# Patient Record
Sex: FEMALE | URBAN - METROPOLITAN AREA
[De-identification: names, ages, dates, MRNs, and addresses within clinical notes are randomized per-mention and may not be internally consistent; named-entity substitution may affect disease eponyms.]

---

## 2024-05-28 DIAGNOSIS — Z84.81 FAMILY HISTORY OF GENETIC DISEASE CARRIER: Primary | ICD-10-CM

## 2024-05-28 NOTE — PROGRESS NOTES
"I reached out to Jesica, the biological mother of one of my parents, at the request of her adoptive parents (child's MRN: [id number]). I spoke to Jesica to review the possibility of parental testing for one or both of Amy's genetic changes.      We reviewed that we want to test Jesica to learn about both Amy's health (if a variant was inherited from a healthy parent, it may lower our suspicion that it is causing her problems) and for Jesica's health (are there other concerns she should be watching out for).      Jesica notes that she has fairly significant health problems ranging from bone aches/fatigue to learning disabilities. She reports an abnormality of gait - she walks on the inner or outer portion of her foot for stability and often will \"flick\" her foot out in front of her to gain stability.      Jesica reports that concern for developmental delays began at around age 5. She notes that school was very difficult and she had IEPs for the duration of her time in school. She reports that she \"doesn't learn normally\" and that learning new things takes her longer than others. She reports significant difficulty with understanding people's facial expressions. She notes that she needs more verbal communication than other people do and she requires things to be broken down. She states that she has a difficult time holding down jobs and that \"no matter what I do, it's never good enough\". She feels that employers will \"give up on her\" rather than take the time to teach things in a way she'll understand them. Jesica reports diagnoses of ADHD, bipolar disorder, major depressive disorder and extreme fear of abandonment (even when illogical).     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 She is  and takes care of her home, dogs, and . Amy is her only child and she is not planning to have additional children. She reports that she does not know any information about Amy's biological father.     Parent " "Information  Full name: Jesica Valle  YOB: 1995  Address to send kit: [address] [place] [address] -- [address] -- [place] [address]  Email: [email or web address]     I asked Jesica to provide me a copy of her insurance card for billing. I've also asked for the policyholder name and  if not her own.      Family History  Jesica notes that her biological mother Delaney is living and well with no major health concerns. She is 47. There is minimal information about Jesica's biological father; he reportedly has a learning disability, ADHD, and depression.     Jesica reports brain bleeds & aneurysms on maternal side. Delaney has warned her to begin monitoring for this concern in her 40s; she is \"being scanned every 5 years or so\". \"Headaches feel like a bomb went off\".     Siblings: Jesica is an only child.   "

## 2024-07-08 ENCOUNTER — TELEPHONE (OUTPATIENT)
Dept: CONSULT | Facility: CLINIC | Age: 29
End: 2024-07-08

## 2024-07-08 NOTE — TELEPHONE ENCOUNTER
"Today, I called Ricardo to review the results of her recent genetic test. To review in brief, I met with Ricardo via telephone to discuss parental testing for her biological daughter, Amy. Ricardo is no longer involved in Amy's care as she has been adopted, but the families are in contact. Ricardo was open to undergoing analysis for Amy's copy number variants, and this testing was ordered after our discussion.     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 This deletion contains portions of two genes, ZCCHC14 and JPH3. Among these two genes, only JPH3 is associated with a genetic disorder at present. This of course may change over time as the field of genetics grows.     Typically, the disease associated with JPH3 is caused by a repeat expansion, or having a copy of the gene with a long string of repeating \"CTG\" sequences. When there are 40+ of these repeats, this gene can cause neurological symptoms like movement problems, emotional disturbances, and cognitive changes. Since Ricardo has a deletion of this gene, however, we would not expect her symptoms to manifest in this way. There are few reports of deletions in JPH3 like Joey, but it has been reported before in two patients with intellectual disability.     At this time, evaluation by a local Genetics team could be of benefit to Ricardo. I will share this test report and an informative letter with Ricardo for both her own record-keeping and to facilitate communication with her own medical team.     Nilda La MS, [facility]  Genetic Counselor - [facility]  Phone: [phone]  Email: [email or web address]    "